# Patient Record
Sex: FEMALE | Race: BLACK OR AFRICAN AMERICAN | NOT HISPANIC OR LATINO | Employment: FULL TIME | URBAN - METROPOLITAN AREA
[De-identification: names, ages, dates, MRNs, and addresses within clinical notes are randomized per-mention and may not be internally consistent; named-entity substitution may affect disease eponyms.]

---

## 2017-03-08 ENCOUNTER — TELEPHONE (OUTPATIENT)
Dept: TRANSPLANT | Facility: CLINIC | Age: 47
End: 2017-03-08

## 2017-03-08 NOTE — TELEPHONE ENCOUNTER
Called pt to verify if she did lab work on 3/6/17. She states she will have them drawn on fri 3/10. Copy of standing lab order re-faxed to her lab.

## 2017-03-10 ENCOUNTER — TELEPHONE (OUTPATIENT)
Dept: TRANSPLANT | Facility: CLINIC | Age: 47
End: 2017-03-10

## 2017-03-10 LAB
EXT ALBUMIN: 3.5
EXT ALKALINE PHOSPHATASE: 53
EXT ALT: 31
EXT AST: 24
EXT BASOPHIL%: 0
EXT BILIRUBIN TOTAL: 0.6
EXT BUN: 16
EXT CALCIUM: 8.4
EXT CHLORIDE: 101
EXT CO2: 23
EXT CREATININE: 0.94 MG/DL
EXT EOSINOPHIL%: 4
EXT GGT: 50
EXT GLUCOSE: 144
EXT HEMATOCRIT: 37.9
EXT HEMOGLOBIN: 12.5
EXT LYMPH%: 34
EXT MONOCYTES%: 13
EXT PLATELETS: 388
EXT POTASSIUM: 3.9
EXT PROTEIN TOTAL: 8
EXT SEGS%: 48
EXT SODIUM: 135 MMOL/L
EXT TACROLIMUS LVL: 6
EXT WBC: 6.5

## 2017-03-13 ENCOUNTER — TELEPHONE (OUTPATIENT)
Dept: TRANSPLANT | Facility: CLINIC | Age: 47
End: 2017-03-13

## 2017-03-13 NOTE — LETTER
March 13, 2017    Kaye Chucky Modi  [address]  [place] [address]          Dear Kaye Modi:  MRN: [id number]    Your lab results were stable.  There are no medicine changes.  Please have your labs drawn again on 6/5/17.      If you cannot have your labs drawn on the scheduled date, it is your responsibility to call the transplant department to reschedule.  To reschedule or make an appointment, please as to speak to or leave a message for my assistant, La Cummings, at [phone].  When leaving a message for Zoila Tovar, or myself, we ask that you leave a brief message regarding your request.    Sincerely,    Jeannie Bailey, RN, BSN  Liver Transplant Coordinator  [facility]  [address] [place] [address]  [phone]

## 2017-04-03 ENCOUNTER — TELEPHONE (OUTPATIENT)
Dept: TRANSPLANT | Facility: CLINIC | Age: 47
End: 2017-04-03

## 2017-04-03 NOTE — TELEPHONE ENCOUNTER
Returned call. Pt states she is getting some teeth filled this week and wanted to make sure she did not need any antibiotics or special clearance. Advised pt that no special instructions needed. Pt verbalized understanding.

## 2017-04-03 NOTE — TELEPHONE ENCOUNTER
----- Message from Marry Victoria sent at 4/3/2017  4:02 PM CDT -----  Contact: pt  Asking for a call regarding dental work, please call [phone]

## 2017-06-06 ENCOUNTER — TELEPHONE (OUTPATIENT)
Dept: TRANSPLANT | Facility: CLINIC | Age: 47
End: 2017-06-06

## 2017-06-06 LAB
EXT ALBUMIN: 3.1
EXT ALKALINE PHOSPHATASE: 47
EXT ALT: 40
EXT AST: 36
EXT BASOPHIL%: 1
EXT BILIRUBIN TOTAL: 0.5
EXT BUN: 11
EXT CALCIUM: 8.4
EXT CHLORIDE: 106
EXT CO2: 21
EXT CREATININE: 0.96 MG/DL
EXT EOSINOPHIL%: 5
EXT GLUCOSE: 144
EXT HEMATOCRIT: 36.4
EXT HEMOGLOBIN: 11.6
EXT LYMPH%: 42
EXT MONOCYTES%: 8
EXT PLATELETS: 393
EXT POTASSIUM: 3.9
EXT PROTEIN TOTAL: 7.6
EXT SEGS%: 46
EXT SODIUM: 136 MMOL/L
EXT TACROLIMUS LVL: 5
EXT WBC: 6.9

## 2017-06-09 ENCOUNTER — TELEPHONE (OUTPATIENT)
Dept: TRANSPLANT | Facility: CLINIC | Age: 47
End: 2017-06-09

## 2017-06-09 NOTE — LETTER
June 9, 2017    Kaye Chucky Modi  [address]  [place] [address]          Dear Kaye Modi:  MRN: [id number]    Your lab results were stable.  There are no medicine changes.  Please have your labs drawn again on 9/4/17.      If you cannot have your labs drawn on the scheduled date, it is your responsibility to call the transplant department to reschedule.  To reschedule or make an appointment, please as to speak to or leave a message for my assistant, La Cummings, at [phone].  When leaving a message for Zoila Tovar, or myself, we ask that you leave a brief message regarding your request.    Sincerely,    Jeannie Bailey, RN, BSN  Liver Transplant Coordinator  [facility]  [address] [place] [address]  [phone]

## 2017-08-21 ENCOUNTER — OFFICE VISIT (OUTPATIENT)
Dept: TRANSPLANT | Facility: CLINIC | Age: 47
End: 2017-08-21
Payer: COMMERCIAL

## 2017-08-21 VITALS
HEIGHT: 66 IN | RESPIRATION RATE: 18 BRPM | WEIGHT: 232.56 LBS | OXYGEN SATURATION: 100 % | HEART RATE: 98 BPM | SYSTOLIC BLOOD PRESSURE: 167 MMHG | TEMPERATURE: 99 F | BODY MASS INDEX: 37.38 KG/M2 | DIASTOLIC BLOOD PRESSURE: 93 MMHG

## 2017-08-21 DIAGNOSIS — K75.4 AUTOIMMUNE HEPATITIS: ICD-10-CM

## 2017-08-21 DIAGNOSIS — Z29.89 NEED FOR PROPHYLACTIC IMMUNOTHERAPY: ICD-10-CM

## 2017-08-21 DIAGNOSIS — Z94.4 LIVER TRANSPLANTED: Primary | ICD-10-CM

## 2017-08-21 DIAGNOSIS — Z94.4 LIVER REPLACED BY TRANSPLANT: Primary | ICD-10-CM

## 2017-08-21 DIAGNOSIS — I10 ESSENTIAL HYPERTENSION: ICD-10-CM

## 2017-08-21 PROCEDURE — 99214 OFFICE O/P EST MOD 30 MIN: CPT | Mod: S$GLB,,, | Performed by: INTERNAL MEDICINE

## 2017-08-21 PROCEDURE — 3080F DIAST BP >= 90 MM HG: CPT | Mod: S$GLB,,, | Performed by: INTERNAL MEDICINE

## 2017-08-21 PROCEDURE — 99999 PR PBB SHADOW E&M-EST. PATIENT-LVL III: CPT | Mod: PBBFAC,,, | Performed by: INTERNAL MEDICINE

## 2017-08-21 PROCEDURE — 3077F SYST BP >= 140 MM HG: CPT | Mod: S$GLB,,, | Performed by: INTERNAL MEDICINE

## 2017-08-21 PROCEDURE — 3008F BODY MASS INDEX DOCD: CPT | Mod: S$GLB,,, | Performed by: INTERNAL MEDICINE

## 2017-08-21 NOTE — Clinical Note
August 21, 2017                     [facility]  [address]  [facility] [address]  Phone: [phone]   Patient: Kaye Modi   MR Number: [id number]   YOB: 1970   Date of Visit: 8/21/2017       Dear      Thank you for referring Kaye Modi to me for evaluation. Attached you will find relevant portions of my assessment and plan of care.    If you have questions, please do not hesitate to call me. I look forward to following Kaye Modi along with you.    Sincerely,    Giovanni William MD    Enclosure    If you would like to receive this communication electronically, please contact [email or web address] or [phone] to request [facility] Link access.    [facility] Link is a tool which provides read-only access to select patient information with whom you have a relationship. Its easy to use and provides real time access to review your patients record including encounter summaries, notes, results, and demographic information.    If you feel you have received this communication in error or would no longer like to receive these types of communications, please e-mail [email or web address]

## 2017-08-21 NOTE — PROGRESS NOTES
Subjective:       Patient ID: Kaye Modi is a 46 y.o. female.    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    Since then she has done very well and her only minor problem was the occurrence of an incisional hernia that was repaired in the early postoperative period.    She has never had any episodes of acute rejection although her enzymes have fluctuated mildly. She is currently on tacrolimus 3/2 mg twice a day and  mg twice a day.    Her glucose level is followed by her primary care doctor and she has controlled diabetes. She does have mild hypertension which is treated and followed by her primary care physician.  She remains well and her only problem is being overweight. She has recently managed to lose some weight.    LFTs- normal  Creatinine 0.96  Tac 5   Body mass index is 38.12 kg/m².    Review of Systems   Constitutional: Negative for activity change, appetite change, chills, fatigue, fever and unexpected weight change.   HENT: Negative for ear pain, hearing loss, nosebleeds, sore throat and trouble swallowing.    Eyes: Negative for redness and visual disturbance.   Respiratory: Negative for cough, chest tightness, shortness of breath and wheezing.    Cardiovascular: Negative for chest pain and palpitations.   Gastrointestinal: Negative for abdominal distention, abdominal pain, blood in stool, constipation, diarrhea, nausea and vomiting.   Genitourinary: Negative for difficulty urinating, dysuria, frequency, hematuria and urgency.   Musculoskeletal: Negative for arthralgias, back pain, gait problem, joint swelling and myalgias.   Skin: Negative for rash.   Neurological: Negative for tremors,  seizures, speech difficulty, weakness and headaches.   Hematological: Negative for adenopathy.   Psychiatric/Behavioral: Negative for confusion, decreased concentration and sleep disturbance. The patient is not nervous/anxious.          Objective:      Physical Exam   Constitutional: She appears well-nourished. No distress.   HENT:   Head: Normocephalic.   Eyes: Pupils are equal, round, and reactive to light.   Neck: No JVD present. No tracheal deviation present. No thyromegaly present.   Cardiovascular: Normal rate, regular rhythm and normal heart sounds.    No murmur heard.  Pulmonary/Chest: Effort normal and breath sounds normal. No stridor.   Abdominal: Soft.   Lymphadenopathy:        Head (right side): No submental, no submandibular, no tonsillar, no preauricular, no posterior auricular and no occipital adenopathy present.        Head (left side): No submental, no submandibular, no tonsillar, no preauricular, no posterior auricular and no occipital adenopathy present.     She has no cervical adenopathy.     She has no axillary adenopathy.   Neurological: She is alert. She has normal strength. She is not disoriented. No cranial nerve deficit or sensory deficit.   Skin: Skin is intact. No cyanosis.       Assessment:       1. Liver replaced by transplant    2. Need for prophylactic immunotherapy    3. Essential hypertension    4. Autoimmune hepatitis        Plan:   - no change in immunosuppression  - encouraged to lose more weight  - discussed dangers of NAFLD  - needs abdo US to check for fatty liver    - ?video visit in [place] in 1 year.

## 2017-08-30 DIAGNOSIS — Z94.4 LIVER TRANSPLANTED: ICD-10-CM

## 2017-08-30 NOTE — TELEPHONE ENCOUNTER
----- Message from Bella Francois sent at 8/30/2017  4:13 PM CDT -----  Contact: patient   Patient calling to get you to send over another prescription for her prograf and cell cept  to [facility] in [place]. Please call  or pt call

## 2017-08-31 ENCOUNTER — HOSPITAL ENCOUNTER (OUTPATIENT)
Dept: RADIOLOGY | Facility: CLINIC | Age: 47
Discharge: HOME | End: 2017-08-31
Attending: INTERNAL MEDICINE
Payer: COMMERCIAL

## 2017-08-31 DIAGNOSIS — Z94.4 LIVER REPLACED BY TRANSPLANT: ICD-10-CM

## 2017-08-31 DIAGNOSIS — I10 ESSENTIAL HYPERTENSION: ICD-10-CM

## 2017-08-31 DIAGNOSIS — Z94.4 LIVER TRANSPLANTED: ICD-10-CM

## 2017-08-31 DIAGNOSIS — K75.4 AUTOIMMUNE HEPATITIS: ICD-10-CM

## 2017-08-31 DIAGNOSIS — Z29.89 NEED FOR PROPHYLACTIC IMMUNOTHERAPY: ICD-10-CM

## 2017-08-31 PROCEDURE — 93975 VASCULAR STUDY: CPT | Mod: TC,PO

## 2017-08-31 PROCEDURE — 93975 VASCULAR STUDY: CPT | Mod: 26,,, | Performed by: RADIOLOGY

## 2017-08-31 PROCEDURE — 76705 ECHO EXAM OF ABDOMEN: CPT | Mod: 26,59,, | Performed by: RADIOLOGY

## 2017-08-31 RX ORDER — TACROLIMUS 1 MG/1
CAPSULE ORAL
Qty: 150 CAPSULE | Refills: 11 | Status: SHIPPED | OUTPATIENT
Start: 2017-08-31 | End: 2019-09-13 | Stop reason: SDUPTHER

## 2017-08-31 RX ORDER — TACROLIMUS 1 MG/1
CAPSULE ORAL
Qty: 150 CAPSULE | Refills: 11 | Status: SHIPPED | OUTPATIENT
Start: 2017-08-31 | End: 2018-09-04 | Stop reason: SDUPTHER

## 2017-08-31 RX ORDER — MYCOPHENOLATE MOFETIL 250 MG/1
CAPSULE ORAL
Qty: 120 CAPSULE | Refills: 11 | Status: SHIPPED | OUTPATIENT
Start: 2017-08-31 | End: 2019-09-13 | Stop reason: SDUPTHER

## 2017-08-31 RX ORDER — MYCOPHENOLATE MOFETIL 250 MG/1
500 CAPSULE ORAL 2 TIMES DAILY
Qty: 120 CAPSULE | Refills: 11 | Status: SHIPPED | OUTPATIENT
Start: 2017-08-31 | End: 2018-09-04 | Stop reason: SDUPTHER

## 2017-08-31 NOTE — LETTER
September 1, 2017    Kaye Modi  [address]  [place] [address]             [facility]  [address]  [facility] [address]  Phone: [phone] Ms. Modi,    Dr. William received and reviewed the results of your ultrasound. He said everything is stable.    Please let me know if you need anything or have any questions.    Sincerely,    Jeannie Bailey, RN, BSN  Liver Transplant Coordinator

## 2017-09-08 ENCOUNTER — TELEPHONE (OUTPATIENT)
Dept: TRANSPLANT | Facility: CLINIC | Age: 47
End: 2017-09-08

## 2017-09-08 NOTE — LETTER
September 8, 2017    Kaye Modi  [address]  [place] [address]          Dear Kaye Rian:  MRN: [id number]    Your lab work was due to be drawn on 9/5/17.  We contacted your lab and were unable to get test results.  It is very important to get your labs drawn as scheduled.  We cannot monitor you for rejection, infections, or drug toxicity side effects without lab results.  Please call us at [phone] as soon as possible to let us know when you plan to have labs drawn.    Sincerely,    Jeannie Bailey, RN, BSN  Liver Transplant Coordinator  [facility]  [address] [place] [address]  [phone]

## 2017-09-11 ENCOUNTER — TELEPHONE (OUTPATIENT)
Dept: TRANSPLANT | Facility: CLINIC | Age: 47
End: 2017-09-11

## 2017-09-11 LAB
EXT ALBUMIN: 3.4
EXT ALKALINE PHOSPHATASE: 55
EXT ALT: 44
EXT AST: 35
EXT BASOPHIL%: 1
EXT BILIRUBIN TOTAL: 0.7
EXT BUN: 8
EXT CALCIUM: 8.7
EXT CHLORIDE: 102
EXT CO2: 26
EXT CREATININE: 0.8 MG/DL
EXT EOSINOPHIL%: 5
EXT GGT: 46
EXT GLUCOSE: 125
EXT HEMATOCRIT: 42.1
EXT HEMOGLOBIN: 13.2
EXT LYMPH%: 42
EXT MONOCYTES%: 12
EXT PLATELETS: 326
EXT POTASSIUM: 4.2
EXT PROTEIN TOTAL: 8
EXT SEGS%: 41
EXT SODIUM: 134 MMOL/L
EXT TACROLIMUS LVL: 5.6
EXT WBC: 5.3

## 2017-09-14 ENCOUNTER — TELEPHONE (OUTPATIENT)
Dept: TRANSPLANT | Facility: CLINIC | Age: 47
End: 2017-09-14

## 2017-09-14 NOTE — LETTER
September 14, 2017    Kaye Modi  [address]  [place] [address]          Dear Kaye Rian:  MRN: [id number]    Your lab results were stable.  There are no medicine changes.  Please have your labs drawn again on 12/11/17.      If you cannot have your labs drawn on the scheduled date, it is your responsibility to call the transplant department to reschedule.  To reschedule or make an appointment, please as to speak to or leave a message for my assistant, La Cummings, at [phone].  When leaving a message for Zoila Tovar, or myself, we ask that you leave a brief message regarding your request.    Sincerely,    Jeannie Bailey, RN, BSN  Liver Transplant Coordinator  [facility]  [address] [place] [address]  [phone]

## 2017-12-13 ENCOUNTER — TELEPHONE (OUTPATIENT)
Dept: TRANSPLANT | Facility: CLINIC | Age: 47
End: 2017-12-13

## 2017-12-13 LAB
EXT ALBUMIN: 3.6
EXT ALKALINE PHOSPHATASE: 63
EXT ALT: 45
EXT AST: 29
EXT BASOPHIL%: 0
EXT BILIRUBIN TOTAL: 0.5
EXT BUN: 10
EXT CALCIUM: 8.4
EXT CHLORIDE: 102
EXT CO2: 24
EXT CREATININE: 0.68 MG/DL
EXT EOSINOPHIL%: 4
EXT GGT: 55
EXT GLUCOSE: 104
EXT HEMATOCRIT: 40.7
EXT HEMOGLOBIN: 12.9
EXT LYMPH%: 43
EXT MONOCYTES%: 8
EXT PLATELETS: 373
EXT POTASSIUM: 4
EXT PROTEIN TOTAL: 8.1
EXT SEGS%: 45
EXT SODIUM: 135 MMOL/L
EXT TACROLIMUS LVL: 4.1
EXT WBC: 5.4

## 2017-12-18 ENCOUNTER — TELEPHONE (OUTPATIENT)
Dept: TRANSPLANT | Facility: CLINIC | Age: 47
End: 2017-12-18

## 2017-12-18 NOTE — TELEPHONE ENCOUNTER
Letter sent, labs stable and no medication changes are needed. Repeat labs due 3/12/18 per protocol.

## 2017-12-18 NOTE — LETTER
December 18, 2017    Kaye Modi  [address]  [place] [address]          Dear Kaye Rian:  MRN: [id number]    Your lab results were stable.  There are no medicine changes.  Please have your labs drawn again on 3/12/18.      If you cannot have your labs drawn on the scheduled date, it is your responsibility to call the transplant department to reschedule.  To reschedule or make an appointment, please as to speak to or leave a message for my assistant, La Cummings, at [phone].  When leaving a message for Zoila Tovar, or myself, we ask that you leave a brief message regarding your request.    Sincerely,    Jeannie Bailey, RN, BSN  Liver Transplant Coordinator  [facility]  [address] [place] [address]  [phone]

## 2018-03-13 LAB
EXT ALBUMIN: 3.4
EXT ALKALINE PHOSPHATASE: 57
EXT ALT: 40
EXT AST: 27
EXT BASOPHIL%: 0
EXT BILIRUBIN TOTAL: 0.8
EXT BUN: 9
EXT CALCIUM: 8.9
EXT CHLORIDE: 103
EXT CO2: 24
EXT CREATININE: 0.83 MG/DL
EXT EOSINOPHIL%: 5
EXT GGT: 52
EXT GLUCOSE: 151
EXT HEMATOCRIT: 40.6
EXT HEMOGLOBIN: 13
EXT LYMPH%: 39
EXT MONOCYTES%: 10
EXT PLATELETS: 330
EXT POTASSIUM: 3.7
EXT PROTEIN TOTAL: 7.9
EXT SEGS%: 46
EXT SODIUM: 135 MMOL/L
EXT TACROLIMUS LVL: 4.5
EXT WBC: 5.1

## 2018-03-16 ENCOUNTER — TELEPHONE (OUTPATIENT)
Dept: TRANSPLANT | Facility: CLINIC | Age: 48
End: 2018-03-16

## 2018-03-16 NOTE — LETTER
March 16, 2018    Kaye Modi  [address]  [place] [address]          Dear Kaye Rian:  MRN: [id number]    Your lab results were stable.  There are no medicine changes.  Please have your labs drawn again on 6/11/18.      If you cannot have your labs drawn on the scheduled date, it is your responsibility to call the transplant department to reschedule.  To reschedule or make an appointment, please as to speak to or leave a message for my assistant, La Cummings, at [phone].  When leaving a message for Zoila Tovar, or myself, we ask that you leave a brief message regarding your request.    Sincerely,      Jeannie Bailey, RN, BSN  Liver Transplant Coordinator  [facility]  [address] [place] [address]  [phone]

## 2018-05-02 ENCOUNTER — TELEPHONE (OUTPATIENT)
Dept: TRANSPLANT | Facility: CLINIC | Age: 48
End: 2018-05-02

## 2018-05-02 NOTE — TELEPHONE ENCOUNTER
----- Message from Harrison Meneses sent at 5/2/2018  3:46 PM CDT -----  Contact: patient   Patient have an medical question about her health and would like a call today if possible.         Please call [phone] or [phone]        Thanks

## 2018-05-02 NOTE — TELEPHONE ENCOUNTER
No answer on pt's cell number.  LVM requesting return call.    Called pt on 2nd number. Spoke with pt. She said she is trying to continue her weight loss. State she has been going to a gym and working out. Wants to know is she can drink smoothies. Advised that smoothies are fine as long as they are not herbal based.  Also discussed that we do not advise any weight loss pills/ supplements. She verbalized understanding. Did offer to help her get into our bariatric weight loss program. She will think about it and let me know if she is interested.

## 2018-06-11 LAB
EXT ALBUMIN: 3.5
EXT ALKALINE PHOSPHATASE: 72
EXT ALT: 31
EXT AST: 20
EXT BASOPHIL%: 1
EXT BILIRUBIN TOTAL: 0.5
EXT BUN: 8
EXT CALCIUM: 7.9
EXT CHLORIDE: 103
EXT CO2: 23
EXT CREATININE: 0.75 MG/DL
EXT EOSINOPHIL%: 6
EXT GGT: 44
EXT GLUCOSE: 174
EXT HEMATOCRIT: 39.9
EXT HEMOGLOBIN: 13.1
EXT LYMPH%: 40
EXT MONOCYTES%: 9
EXT PLATELETS: 319
EXT POTASSIUM: 3.9
EXT PROTEIN TOTAL: 7.7
EXT SEGS%: 45
EXT SODIUM: 133 MMOL/L
EXT TACROLIMUS LVL: 7.4
EXT WBC: 5.6

## 2018-06-12 ENCOUNTER — TELEPHONE (OUTPATIENT)
Dept: TRANSPLANT | Facility: CLINIC | Age: 48
End: 2018-06-12

## 2018-06-13 ENCOUNTER — TELEPHONE (OUTPATIENT)
Dept: TRANSPLANT | Facility: CLINIC | Age: 48
End: 2018-06-13

## 2018-06-13 NOTE — LETTER
June 13, 2018    Kaye Modi  [address]  [place] [address]          Dear Kaye Rian:  MRN: [id number]    Your lab results were stable.  There are no medicine changes.  Please have your labs drawn again on 9/10/18.      If you cannot have your labs drawn on the scheduled date, it is your responsibility to call the transplant department to reschedule.  To reschedule or make an appointment, please as to speak to or leave a message for my assistant, La Cummings, at [phone].  When leaving a message for Zoila Tovar, or myself, we ask that you leave a brief message regarding your request.    Sincerely,      Jeannie Bailey, RN, BSN  Liver Transplant Coordinator    [facility]  [address] [place] [address]  [phone]

## 2018-06-13 NOTE — TELEPHONE ENCOUNTER
Letter sent, labs stable and no medication changes are needed. Repeat labs due 9/10/18 per protocol.

## 2018-09-04 DIAGNOSIS — Z94.4 LIVER TRANSPLANTED: ICD-10-CM

## 2018-09-04 RX ORDER — TACROLIMUS 1 MG/1
CAPSULE ORAL
Qty: 150 CAPSULE | Refills: 11 | Status: SHIPPED | OUTPATIENT
Start: 2018-09-04 | End: 2019-09-13 | Stop reason: SDUPTHER

## 2018-09-04 RX ORDER — MYCOPHENOLATE MOFETIL 250 MG/1
CAPSULE ORAL
Qty: 120 CAPSULE | Refills: 11 | Status: SHIPPED | OUTPATIENT
Start: 2018-09-04 | End: 2019-09-13 | Stop reason: SDUPTHER

## 2018-09-11 LAB
EXT ALBUMIN: 3.6
EXT ALKALINE PHOSPHATASE: 73
EXT ALT: 55
EXT AST: 37
EXT BASOPHIL%: 1
EXT BILIRUBIN TOTAL: 0.7
EXT BUN: 8
EXT CALCIUM: 8.6
EXT CHLORIDE: 105
EXT CO2: 25
EXT CREATININE: 0.79 MG/DL
EXT EOSINOPHIL%: 7
EXT GGT: 101
EXT GLUCOSE: 146
EXT HEMATOCRIT: 41.5
EXT HEMOGLOBIN: 13.5
EXT LYMPH%: 48
EXT MONOCYTES%: 9
EXT PLATELETS: 333
EXT POTASSIUM: 3.8
EXT PROTEIN TOTAL: 8.4
EXT SEGS%: 36
EXT SODIUM: 140 MMOL/L
EXT TACROLIMUS LVL: 7.4
EXT WBC: 4.1

## 2018-09-12 ENCOUNTER — TELEPHONE (OUTPATIENT)
Dept: TRANSPLANT | Facility: CLINIC | Age: 48
End: 2018-09-12

## 2018-09-12 NOTE — TELEPHONE ENCOUNTER
----- Message from Karina Aguilar sent at 9/12/2018  8:26 AM CDT -----  Called and sp to pt about video visit. Re/[facility]'ed her for video visit  ----- Message -----  From: Jeannie Bailey RN  Sent: 9/11/2018   5:11 PM  To: Karina Dennis    Sending to both of you. Can one of you find out if this is still possible?    Jeannie    ----- Message -----  From: Damaris Claudio  Sent: 9/11/2018   4:51 PM  To: [facility] Post-Liver Transplant Clinical    Pt calling to find out if she can still do a video phone conference in place of appt on 9/24/18    pls call to confirm or deny    Contact cell [phone] or home [phone]

## 2018-09-14 ENCOUNTER — TELEPHONE (OUTPATIENT)
Dept: TRANSPLANT | Facility: CLINIC | Age: 48
End: 2018-09-14

## 2018-09-14 ENCOUNTER — PATIENT MESSAGE (OUTPATIENT)
Dept: TRANSPLANT | Facility: CLINIC | Age: 48
End: 2018-09-14

## 2018-09-14 NOTE — TELEPHONE ENCOUNTER
Letter sent, labs stable and no medication changes are needed. Repeat labs due 12/10/18 per protocol.

## 2018-09-14 NOTE — TELEPHONE ENCOUNTER
----- Message from Karina Aguilar sent at 9/14/2018  3:25 PM CDT -----  Called pt and told her that we need her to sing her consent form for her video visit. She said she will do it when she gets home

## 2018-09-14 NOTE — LETTER
September 14, 2018    Kaye Modi  [address]  [place] [address]          Dear Kaye Rian:  MRN: [id number]    Your lab results were stable.  There are no medicine changes.  Please have your labs drawn again on 12/10/18.      If you cannot have your labs drawn on the scheduled date, it is your responsibility to call the transplant department to reschedule.  To reschedule or make an appointment, please as to speak to or leave a message for my assistant, La Cummings, at [phone].  When leaving a message for Zoila Tovar, or myself, we ask that you leave a brief message regarding your request.    Sincerely,      Jeannie Bailey, RN, BSN  Liver Transplant Coordinator  [facility]  [address] [place] [address]  [phone]

## 2018-09-18 ENCOUNTER — TELEPHONE (OUTPATIENT)
Dept: TRANSPLANT | Facility: CLINIC | Age: 48
End: 2018-09-18

## 2018-09-18 NOTE — TELEPHONE ENCOUNTER
----- Message from Damaris Claudio sent at 9/18/2018  3:58 PM CDT -----  Calling to find out where to go on the portal to sign paper work     Contact [phone]

## 2018-09-18 NOTE — TELEPHONE ENCOUNTER
Called pt, reviewed how to complete form on [employer]. Pt will call back if she has any further problems.

## 2018-09-24 ENCOUNTER — PATIENT MESSAGE (OUTPATIENT)
Dept: TRANSPLANT | Facility: CLINIC | Age: 48
End: 2018-09-24

## 2018-09-24 ENCOUNTER — OFFICE VISIT (OUTPATIENT)
Dept: TRANSPLANT | Facility: CLINIC | Age: 48
End: 2018-09-24
Payer: COMMERCIAL

## 2018-09-24 DIAGNOSIS — Z29.89 NEED FOR PROPHYLACTIC IMMUNOTHERAPY: Primary | ICD-10-CM

## 2018-09-24 DIAGNOSIS — E11.9 CONTROLLED TYPE 2 DIABETES MELLITUS WITHOUT COMPLICATION, WITHOUT LONG-TERM CURRENT USE OF INSULIN: ICD-10-CM

## 2018-09-24 DIAGNOSIS — Z94.4 LIVER REPLACED BY TRANSPLANT: ICD-10-CM

## 2018-09-24 DIAGNOSIS — K75.4 AUTOIMMUNE HEPATITIS: ICD-10-CM

## 2018-09-24 PROCEDURE — 99444 PR PHYSICIAN ONLINE EVALUATION & MANAGEMENT SERVICE: CPT | Mod: CSM,,, | Performed by: INTERNAL MEDICINE

## 2018-09-24 NOTE — PROGRESS NOTES
Subjective:       Patient ID: Kaye Modi is a 47 y.o. female.    Chief Complaint: No chief complaint on file.    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    Since then she has done very well and her only minor problem was the occurrence of an incisional hernia that was repaired in the early postoperative period.    She has never had any episodes of acute rejection although her enzymes have fluctuated mildly. She is currently on tacrolimus 3/2 mg twice a day and  mg twice a day.    Her glucose level is followed by her primary care doctor and she has controlled diabetes. She does have mild hypertension which is treated and followed by her primary care physician.  She remains well and her only problem is being overweight. She has recently managed to lose some weight.    LFTs- recently raised to around 55  Creatinine 0.96  Tac 5   There is no height or weight on file to calculate BMI.    Review of Systems   Constitutional: Negative for activity change, appetite change, chills, fatigue, fever and unexpected weight change.   HENT: Negative for ear pain, hearing loss, nosebleeds, sore throat and trouble swallowing.    Eyes: Negative for redness and visual disturbance.   Respiratory: Negative for cough, chest tightness, shortness of breath and wheezing.    Cardiovascular: Negative for chest pain and palpitations.   Gastrointestinal: Negative for abdominal distention, abdominal pain, blood in stool, constipation, diarrhea, nausea and vomiting.   Genitourinary: Negative for difficulty urinating, dysuria, frequency, hematuria and urgency.   Musculoskeletal: Negative for arthralgias, back pain, gait problem, joint swelling and myalgias.   Skin:  Negative for rash.   Neurological: Negative for tremors, seizures, speech difficulty, weakness and headaches.   Hematological: Negative for adenopathy.   Psychiatric/Behavioral: Negative for confusion, decreased concentration and sleep disturbance. The patient is not nervous/anxious.          Objective:    No examination done- this was a video visit.        Assessment:       1. Need for prophylactic immunotherapy    2. Liver replaced by transplant    3. Controlled type 2 diabetes mellitus without complication, without long-term current use of insulin    4. Autoimmune hepatitis        Plan:   - no change in immunosuppression  - encouraged to lose more weight  - discussed risks of NAFLD    - repeat LFTs in 2 weeks and may arrange a liver biopsy to rule out ACR, NAFLD or recurrence of autoimmune hep.    Clinic in 1 year- video visit    - ?video visit in [place] in 1 year.

## 2018-09-24 NOTE — Clinical Note
September 24, 2018                     [facility]  [address]  [facility] [address]  Phone: [phone]   Patient: Kaye Modi   MR Number: [id number]   YOB: 1970   Date of Visit: 9/24/2018       Dear      Thank you for referring Kaye Modi to me for evaluation. Attached you will find relevant portions of my assessment and plan of care.    If you have questions, please do not hesitate to call me. I look forward to following Kaye Modi along with you.    Sincerely,    Giovanni William MD    Enclosure    If you would like to receive this communication electronically, please contact [email or web address] or [phone] to request [email or web address] Link access.    [email or web address] Link is a tool which provides read-only access to select patient information with whom you have a relationship. Its easy to use and provides real time access to review your patients record including encounter summaries, notes, results, and demographic information.    If you feel you have received this communication in error or would no longer like to receive these types of communications, please e-mail [email or web address]

## 2018-10-11 ENCOUNTER — TELEPHONE (OUTPATIENT)
Dept: TRANSPLANT | Facility: CLINIC | Age: 48
End: 2018-10-11

## 2018-10-11 NOTE — TELEPHONE ENCOUNTER
Called pt to discuss need for repeat labs. No answer. LVM requesting repeat labs Monday, 10/15/18. Requested return call with any questions or concerns.

## 2018-10-15 LAB
EXT ALBUMIN: 3.7
EXT ALKALINE PHOSPHATASE: 70
EXT ALT: 34
EXT AST: 21
EXT BASOPHIL%: 1
EXT BILIRUBIN TOTAL: 0.6
EXT BUN: 10
EXT CALCIUM: 8.5
EXT CHLORIDE: 103
EXT CO2: 26
EXT CREATININE: 0.88 MG/DL
EXT EOSINOPHIL%: 6
EXT GGT: 61
EXT GLUCOSE: 162
EXT HEMATOCRIT: 38.9
EXT HEMOGLOBIN: 12.6
EXT INR: 0.95
EXT LYMPH%: 42
EXT MONOCYTES%: 10
EXT PLATELETS: 362
EXT POTASSIUM: 4
EXT PROTEIN TOTAL: 8.6
EXT PT: 9.7
EXT SEGS%: 41
EXT SODIUM: 136 MMOL/L
EXT TACROLIMUS LVL: 4.3
EXT WBC: 5.7

## 2018-10-17 ENCOUNTER — TELEPHONE (OUTPATIENT)
Dept: TRANSPLANT | Facility: CLINIC | Age: 48
End: 2018-10-17

## 2018-10-17 NOTE — TELEPHONE ENCOUNTER
Spoke with pt. Reviewed stable lab results and that she does not need a biopsy per Dr. William. Pt agreed to repeat labs in 3 months - 1/14/19.  Will send letter as a reminder.

## 2018-10-17 NOTE — LETTER
October 17, 2018    Kaye Modi  [address]  [place] [address]          Dear Kaye Rian:  MRN: [id number]    Your lab results were stable.  There are no medicine changes.  Please have your labs drawn again on 1/14/19.      If you cannot have your labs drawn on the scheduled date, it is your responsibility to call the transplant department to reschedule.  To reschedule or make an appointment, please as to speak to or leave a message for my assistant, La Cummings, at [phone].  When leaving a message for Zoila Tovar, or myself, we ask that you leave a brief message regarding your request.    Sincerely,      Jeannie Bailey, RN, BSN  Liver Transplant Coordinator  [facility]  [address] [place] [address]  [phone]

## 2018-10-17 NOTE — TELEPHONE ENCOUNTER
----- Message from Giovanni William MD sent at 10/17/2018  9:57 AM CDT -----  Hold off for now    ----- Message -----  From: Jeannie Bailey RN  Sent: 10/17/2018   9:20 AM  To: Giovanni William MD    Your clinic note mentioned she may need a liver biopsy to check for JOHNSON but you wanted to see her labs first.  Do you want me to schedule her a biopsy?    Jeannie    ----- Message -----  From: Giovanni William MD  Sent: 10/15/2018   4:02 PM  To: [facility] Post-Liver Transplant Clinical    Results reviewed

## 2019-01-15 LAB
EXT ALBUMIN: 3.3
EXT ALKALINE PHOSPHATASE: 87
EXT ALT: 63
EXT AST: 33
EXT BASOPHIL%: 1
EXT BILIRUBIN TOTAL: 0.4
EXT BUN: 13
EXT CALCIUM: 8.6
EXT CHLORIDE: 103
EXT CO2: 27
EXT CREATININE: 0.9 MG/DL
EXT EOSINOPHIL%: 4
EXT GGT: 97
EXT GLUCOSE: 184
EXT HEMATOCRIT: 36.6
EXT HEMOGLOBIN: 11.9
EXT LYMPH%: 39
EXT MONOCYTES%: 6
EXT PLATELETS: 382
EXT POTASSIUM: 4.3
EXT PROTEIN TOTAL: 8
EXT SEGS%: 50
EXT SODIUM: 136 MMOL/L
EXT TACROLIMUS LVL: 6.9
EXT WBC: 6.7

## 2019-01-22 ENCOUNTER — TELEPHONE (OUTPATIENT)
Dept: TRANSPLANT | Facility: CLINIC | Age: 49
End: 2019-01-22

## 2019-01-22 NOTE — TELEPHONE ENCOUNTER
Spoke with pt. Reviewed lab results and that LFTs slightly elevated. Requested repeat labs in 1 month - 2/11/19. Pt agreed with plan.

## 2019-02-06 NOTE — TELEPHONE ENCOUNTER
Ernesto English [phone] [address] [facility] to leave a detailed message Tacrolimus level pending

## 2019-02-12 LAB
EXT ALBUMIN: 3.4
EXT ALKALINE PHOSPHATASE: 76
EXT ALT: 37
EXT AST: 23
EXT BASOPHIL%: 1
EXT BILIRUBIN TOTAL: 0.7
EXT BUN: 10
EXT CALCIUM: 8.5
EXT CHLORIDE: 102
EXT CO2: 25
EXT CREATININE: 0.79 MG/DL
EXT EOSINOPHIL%: 6
EXT GGT: 66
EXT GLUCOSE: 190
EXT HEMATOCRIT: 36.8
EXT HEMOGLOBIN: 12.2
EXT LYMPH%: 47
EXT MONOCYTES%: 9
EXT PLATELETS: 370
EXT POTASSIUM: 3.9
EXT PROTEIN TOTAL: 8.1
EXT SEGS%: 37
EXT SODIUM: 137 MMOL/L
EXT TACROLIMUS LVL: 7.7
EXT WBC: 4.8

## 2019-02-13 ENCOUNTER — TELEPHONE (OUTPATIENT)
Dept: TRANSPLANT | Facility: CLINIC | Age: 49
End: 2019-02-13

## 2019-02-18 ENCOUNTER — TELEPHONE (OUTPATIENT)
Dept: TRANSPLANT | Facility: CLINIC | Age: 49
End: 2019-02-18

## 2019-02-18 NOTE — TELEPHONE ENCOUNTER
Letter sent, labs stable and no medication changes are needed. Repeat labs due 5/13/19 per protocol.

## 2019-02-18 NOTE — LETTER
February 18, 2019    Kaye Modi  [address]   [place] [address]          Dear Kaye Rian:  MRN: [id number]    This is a follow up to your recent labs, your lab results were stable.  There are no medicine changes.  Please have your labs drawn again on 5/13/19.      If you cannot have your labs drawn on the scheduled date, it is your responsibility to call the transplant department to reschedule.  To reschedule or make an appointment, please as to speak to or leave a message for my assistant, La Cummings, at [phone].  When leaving a message for Zoila Tovar, or myself, we ask that you leave a brief message regarding your request.    Sincerely,    Jeannie Bailey, RN, BSN, [facility]  Liver Transplant Coordinator  [facility]  [address] [place] [address]  [phone]

## 2019-05-14 LAB
EXT ALBUMIN: 3.5
EXT ALKALINE PHOSPHATASE: 76
EXT ALT: 29
EXT AST: 18
EXT BASOPHIL%: 1
EXT BILIRUBIN TOTAL: 0.7
EXT BUN: 13
EXT CALCIUM: 9.2
EXT CHLORIDE: 99
EXT CO2: 23
EXT CREATININE: 0.87 MG/DL
EXT EOSINOPHIL%: 6
EXT GGT: 40
EXT GLUCOSE: 317
EXT HEMATOCRIT: 40.9
EXT HEMOGLOBIN: 13.1
EXT LYMPH%: 39
EXT MONOCYTES%: 8
EXT PLATELETS: 301
EXT POTASSIUM: 4
EXT PROTEIN TOTAL: 8
EXT SEGS%: 47
EXT SODIUM: 135 MMOL/L
EXT TACROLIMUS LVL: 5.3
EXT WBC: 4.8

## 2019-05-15 ENCOUNTER — TELEPHONE (OUTPATIENT)
Dept: TRANSPLANT | Facility: CLINIC | Age: 49
End: 2019-05-15

## 2019-05-17 ENCOUNTER — TELEPHONE (OUTPATIENT)
Dept: TRANSPLANT | Facility: CLINIC | Age: 49
End: 2019-05-17

## 2019-05-17 NOTE — LETTER
May 17, 2019    Kaye Modi  [address]   [place] [address]          Dear Kaye Rian:  MRN: [id number]    This is a follow up to your recent labs, your lab results were stable.  There are no medicine changes.  Please have your labs drawn again on 8/19/19.      If you cannot have your labs drawn on the scheduled date, it is your responsibility to call the transplant department to reschedule.  To reschedule or make an appointment, please as to speak to or leave a message for my assistant, Sue Tovar or Ellie, at [phone].  When leaving a message for Sue Tovar Angela or myself, we ask that you leave a brief message regarding your request.    Sincerely,    Jeannie Bailey, RN, BSN, [facility]  Liver Transplant Coordinator  [facility]  [address] [place] [address]  [phone]

## 2019-05-17 NOTE — TELEPHONE ENCOUNTER
Letter sent, labs stable and no medication changes are needed. Repeat labs due 8/19/19 per protocol.

## 2019-08-19 LAB
EXT ALBUMIN: 3.3
EXT ALKALINE PHOSPHATASE: 83
EXT ALT: 19
EXT AST: 15
EXT BASOPHIL%: 1
EXT BILIRUBIN TOTAL: 0.5
EXT BUN: 10
EXT CALCIUM: 8.6
EXT CHLORIDE: 102
EXT CO2: 22
EXT CREATININE: 0.78 MG/DL
EXT EOSINOPHIL%: 5
EXT GGT: 35
EXT GLUCOSE: 263
EXT HEMATOCRIT: 38
EXT HEMOGLOBIN: 12
EXT LYMPH%: 32
EXT MONOCYTES%: 14
EXT PLATELETS: 381
EXT POTASSIUM: 3.9
EXT PROTEIN TOTAL: 7.9
EXT SEGS%: 48
EXT SODIUM: 134 MMOL/L
EXT TACROLIMUS LVL: 5.3
EXT WBC: 6.6

## 2019-08-20 ENCOUNTER — TELEPHONE (OUTPATIENT)
Dept: TRANSPLANT | Facility: CLINIC | Age: 49
End: 2019-08-20

## 2019-08-22 ENCOUNTER — TELEPHONE (OUTPATIENT)
Dept: TRANSPLANT | Facility: CLINIC | Age: 49
End: 2019-08-22

## 2019-08-22 NOTE — TELEPHONE ENCOUNTER
Letter sent, labs stable and no medication changes are needed. Repeat labs due 12/2/19 per protocol.

## 2019-08-22 NOTE — LETTER
August 22, 2019    Kaye Modi  [address]   [place] [address]          Dear Kaye Rian:  MRN: [id number]    This is a follow up to your recent labs, your lab results were stable.  There are no medicine changes.  Please have your labs drawn again on 12/2/19.      If you cannot have your labs drawn on the scheduled date, it is your responsibility to call the transplant department to reschedule.  To reschedule or make an appointment, please as to speak to or leave a message for my assistant, Sue Tovar or Ellie, at [phone].  When leaving a message for Sue Tovar Angela or myself, we ask that you leave a brief message regarding your request.    Sincerely,      Jeannie Bailey, RN, BSN, [facility]  Liver Transplant Coordinator  [facility]  [address] [place] [address]  [phone]

## 2019-08-23 ENCOUNTER — TELEPHONE (OUTPATIENT)
Dept: TRANSPLANT | Facility: CLINIC | Age: 49
End: 2019-08-23

## 2019-08-23 NOTE — TELEPHONE ENCOUNTER
----- Message from Karina Aguilar sent at 8/23/2019  2:31 PM CDT -----    Called and sp to pt about [facility] a vidoe visit appt. Pt is [facility]'ed for 11/4 and remind her that she will need to be in [place] at the time of the appt with a copay of $49.

## 2019-09-13 RX ORDER — MYCOPHENOLATE MOFETIL 250 MG/1
CAPSULE ORAL
Qty: 120 CAPSULE | Refills: 11 | Status: SHIPPED | OUTPATIENT
Start: 2019-09-13 | End: 2019-09-13 | Stop reason: SDUPTHER

## 2019-09-13 RX ORDER — TACROLIMUS 1 MG/1
CAPSULE ORAL
Qty: 150 CAPSULE | Refills: 11 | Status: SHIPPED | OUTPATIENT
Start: 2019-09-13 | End: 2019-09-13 | Stop reason: SDUPTHER

## 2019-09-13 NOTE — TELEPHONE ENCOUNTER
Sent refills for Cellcept 500 mg by mouth BID and Prograf 3mg by mouth every am and 2 mg by mouth every pm per patient mar and past notes

## 2019-09-13 NOTE — TELEPHONE ENCOUNTER
----- Message from Bora Reyna sent at 9/13/2019  1:44 PM CDT -----  Contact: patient   Please call pt at [phone]    Refill request for mycophenolate (CELLCEPT) 250 mg Cap (4 tabs a day) &   tacrolimus (PROGRAF) 1 MG Cap (5 tabs a day)    [facility] [place] [place] - [address] [facility]    Thank you

## 2019-09-16 RX ORDER — MYCOPHENOLATE MOFETIL 250 MG/1
500 CAPSULE ORAL 2 TIMES DAILY
Qty: 120 CAPSULE | Refills: 11 | Status: SHIPPED | OUTPATIENT
Start: 2019-09-16 | End: 2020-09-24

## 2019-09-16 RX ORDER — TACROLIMUS 1 MG/1
CAPSULE ORAL
Qty: 150 CAPSULE | Refills: 11 | Status: SHIPPED | OUTPATIENT
Start: 2019-09-16 | End: 2020-09-24

## 2019-11-04 ENCOUNTER — OFFICE VISIT (OUTPATIENT)
Dept: TRANSPLANT | Facility: CLINIC | Age: 49
End: 2019-11-04
Payer: COMMERCIAL

## 2019-11-04 DIAGNOSIS — Z94.4 LIVER REPLACED BY TRANSPLANT: ICD-10-CM

## 2019-11-04 DIAGNOSIS — E11.9 CONTROLLED TYPE 2 DIABETES MELLITUS WITHOUT COMPLICATION, WITHOUT LONG-TERM CURRENT USE OF INSULIN: ICD-10-CM

## 2019-11-04 DIAGNOSIS — Z29.89 NEED FOR PROPHYLACTIC IMMUNOTHERAPY: Primary | ICD-10-CM

## 2019-11-04 DIAGNOSIS — K75.4 AUTOIMMUNE HEPATITIS: ICD-10-CM

## 2019-11-04 PROCEDURE — 99214 OFFICE O/P EST MOD 30 MIN: CPT | Mod: 95,,, | Performed by: INTERNAL MEDICINE

## 2019-11-04 PROCEDURE — 99214 PR OFFICE/OUTPT VISIT, EST, LEVL IV, 30-39 MIN: ICD-10-PCS | Mod: 95,,, | Performed by: INTERNAL MEDICINE

## 2019-11-04 NOTE — Clinical Note
November 4, 2019                     [facility]  [address]  [facility] [address]  Phone: [phone]   Patient: Kaye Modi   MR Number: [id number]   YOB: 1970   Date of Visit: 11/4/2019       Dear      Thank you for referring Kaye Modi to me for evaluation. Attached you will find relevant portions of my assessment and plan of care.    If you have questions, please do not hesitate to call me. I look forward to following Kaye Modi along with you.    Sincerely,    Giovanni William MD    Enclosure    If you would like to receive this communication electronically, please contact [email or web address] or [phone] to request I-Pulse Link access.    I-Pulse Link is a tool which provides read-only access to select patient information with whom you have a relationship. Its easy to use and provides real time access to review your patients record including encounter summaries, notes, results, and demographic information.    If you feel you have received this communication in error or would no longer like to receive these types of communications, please e-mail [email or web address]

## 2019-11-04 NOTE — PROGRESS NOTES
Subjective:       Patient ID: Kaye Modi is a 48 y.o. female.    Chief Complaint: No chief complaint on file.    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    Since then she has done very well and her only minor problem was the occurrence of an incisional hernia that was repaired in the early postoperative period.    She has never had any episodes of acute rejection although her enzymes have fluctuated mildly. She is currently on tacrolimus 3/2 mg twice a day and  mg twice a day.    Her glucose level is followed by her primary care doctor and she has controlled diabetes. She does have mild hypertension which is treated and followed by her primary care physician.  She remains well and her only problem is being overweight. She has recently managed to lose some weight.    LFTs- normal  Creatinine 0.78  Tac 5.3  There is no height or weight on file to calculate BMI.     She has been well over the last year and her BP is maintained around 130-140 mmHg systolic.  She has lost about 20 lb in weight.    Review of Systems   Constitutional: Negative for activity change, appetite change, chills, fatigue, fever and unexpected weight change.   HENT: Negative for ear pain, hearing loss, nosebleeds, sore throat and trouble swallowing.    Eyes: Negative for redness and visual disturbance.   Respiratory: Negative for cough, chest tightness, shortness of breath and wheezing.    Cardiovascular: Negative for chest pain and palpitations.   Gastrointestinal: Negative for abdominal distention, abdominal pain, blood in stool, constipation, diarrhea, nausea and vomiting.   Genitourinary: Negative for difficulty urinating, dysuria, frequency, hematuria and  urgency.   Musculoskeletal: Negative for arthralgias, back pain, gait problem, joint swelling and myalgias.   Skin: Negative for rash.   Neurological: Negative for tremors, seizures, speech difficulty, weakness and headaches.   Hematological: Negative for adenopathy.   Psychiatric/Behavioral: Negative for confusion, decreased concentration and sleep disturbance. The patient is not nervous/anxious.          Objective:    No examination done- this was a video visit.        Assessment:       1. Need for prophylactic immunotherapy    2. Liver replaced by transplant    3. Controlled type 2 diabetes mellitus without complication, without long-term current use of insulin    4. Autoimmune hepatitis        Plan:   - no change in immunosuppression  - encouraged to lose more weight  - discussed risks of NAFLD    Clinic in 1 year- video visit

## 2019-12-03 LAB
EXT ALBUMIN: 4.1
EXT ALKALINE PHOSPHATASE: 86
EXT ALT: 33
EXT AST: 28
EXT BASOPHIL%: 1
EXT BILIRUBIN TOTAL: 0.8
EXT BUN: 11
EXT CALCIUM: 8.4
EXT CHLORIDE: 101
EXT CO2: 25
EXT CREATININE: 0.74 MG/DL
EXT EOSINOPHIL%: 6
EXT GGT: 64
EXT GLUCOSE: 159
EXT HEMATOCRIT: 39.4
EXT HEMOGLOBIN: 12.5
EXT LYMPH%: 37
EXT MONOCYTES%: 8
EXT PLATELETS: 394
EXT POTASSIUM: 3.8
EXT PROTEIN TOTAL: 8.1
EXT SEGS%: 49
EXT SODIUM: 137 MMOL/L
EXT TACROLIMUS LVL: 6.6
EXT WBC: 5.5

## 2019-12-04 ENCOUNTER — TELEPHONE (OUTPATIENT)
Dept: TRANSPLANT | Facility: CLINIC | Age: 49
End: 2019-12-04

## 2019-12-04 NOTE — LETTER
December 4, 2019    Kaye Modi  [address]   [place] [address]          Dear Kaye Rian:  MRN: [id number]    This is a follow up to your recent labs, your lab results were stable.  There are no medicine changes.  Please have your labs drawn again on 3/2/20.      If you cannot have your labs drawn on the scheduled date, it is your responsibility to call the transplant department to reschedule.  To reschedule or make an appointment, please as to speak to or leave a message for my assistant, Sue Tovar or Ellie, at [phone].  When leaving a message for Sue Tovar Angela or myself, we ask that you leave a brief message regarding your request.    Sincerely,      Jeannie Bailey, RN, BSN, [facility]  Liver Transplant Coordinator  [facility]  [address] [place] [address]  [phone]

## 2019-12-04 NOTE — TELEPHONE ENCOUNTER
Letter sent, labs stable and no medication changes are needed. Repeat labs due 3/2/20 per protocol.

## 2020-03-10 LAB
EXT ALBUMIN: 3.3
EXT ALKALINE PHOSPHATASE: 81
EXT ALT: 40
EXT AST: 34
EXT BASOPHIL%: 1
EXT BILIRUBIN TOTAL: 0.3
EXT BUN: 11
EXT CALCIUM: 8.3
EXT CHLORIDE: 102
EXT CO2: 21
EXT CREATININE: 0.82 MG/DL
EXT EOSINOPHIL%: 9
EXT GGT: 56
EXT GLUCOSE: 262
EXT HEMATOCRIT: 37.8
EXT HEMOGLOBIN: 12.1
EXT LYMPH%: 38
EXT MONOCYTES%: 13
EXT PLATELETS: 322
EXT POTASSIUM: 4
EXT PROTEIN TOTAL: 7.8
EXT SEGS%: 39
EXT SODIUM: 134 MMOL/L
EXT TACROLIMUS LVL: 8.6
EXT WBC: 3.7

## 2020-03-12 ENCOUNTER — TELEPHONE (OUTPATIENT)
Dept: TRANSPLANT | Facility: CLINIC | Age: 50
End: 2020-03-12

## 2020-03-12 NOTE — LETTER
March 12, 2020    Kaye Modi  [address]  [place] [address]          Dear Kayejulienne Modi:  MRN: [id number]    This is a follow up to your recent labs, your lab results were stable.  There are no medicine changes.  Please have your labs drawn again on 6/8/20.      If you cannot have your labs drawn on the scheduled date, it is your responsibility to call the transplant department to reschedule.  To reschedule or make an appointment, please as to speak to or leave a message for my assistant, Sue Tovar or Ellie, at [phone].  When leaving a message for Sue Tovar Angela or myself, we ask that you leave a brief message regarding your request.    Sincerely,    Jeannie Bailey, RN, BSN, [facility]  Liver Transplant Coordinator  [facility]  [facility] [place] [address]  [phone]

## 2020-06-09 LAB
EXT ALBUMIN: 3.5
EXT ALKALINE PHOSPHATASE: 73
EXT ALT: 24
EXT AST: 17
EXT BASOPHIL%: 0
EXT BILIRUBIN TOTAL: 0.8
EXT BUN: 11
EXT CALCIUM: 8.5
EXT CHLORIDE: 99
EXT CO2: 21
EXT CREATININE: 0.96 MG/DL
EXT EOSINOPHIL%: 4
EXT GGT: 42
EXT GLUCOSE: 318
EXT HEMATOCRIT: 39.8
EXT HEMOGLOBIN: 12.4
EXT LYMPH%: 35
EXT MONOCYTES%: 7
EXT PLATELETS: 388
EXT POTASSIUM: 3.9
EXT PROTEIN TOTAL: 8.1
EXT SEGS%: 54
EXT SODIUM: 132 MMOL/L
EXT TACROLIMUS LVL: 8.8
EXT WBC: 5.1

## 2020-06-11 ENCOUNTER — TELEPHONE (OUTPATIENT)
Dept: TRANSPLANT | Facility: CLINIC | Age: 50
End: 2020-06-11

## 2020-06-11 NOTE — LETTER
June 11, 2020    Kaye Modi  [address]  [place] [address]          Dear Kaye Rian:  MRN: [id number]    This is a follow up to your recent labs, your lab results were stable.  There are no medicine changes.  Please have your labs drawn again on 9/7/20.      If you cannot have your labs drawn on the scheduled date, it is your responsibility to call the transplant department to reschedule.  Please call [phone] and ask to speak to [facility] -  for all scheduling requests.     Sincerely,    Jeannie Bailey, RN, BSN, [facility]  Liver Transplant Coordinator  [facility]  [address] [place] [address]  [phone]

## 2020-06-11 NOTE — TELEPHONE ENCOUNTER
Letter sent, labs stable and no medication changes are needed. Repeat labs due 9/7/20 per protocol.

## 2020-07-21 ENCOUNTER — TELEPHONE (OUTPATIENT)
Dept: TRANSPLANT | Facility: CLINIC | Age: 50
End: 2020-07-21

## 2020-07-21 NOTE — TELEPHONE ENCOUNTER
Spoke with pt. States she had a PCP apt and found to have elevated blood glucose. She wanted to know if she can take Januvia. Advised pt this is ok to take from a transplant perspective.

## 2020-09-11 ENCOUNTER — TELEPHONE (OUTPATIENT)
Dept: TRANSPLANT | Facility: CLINIC | Age: 50
End: 2020-09-11

## 2020-09-14 LAB
EXT ALBUMIN: 3.4
EXT ALKALINE PHOSPHATASE: 59
EXT ALT: 26
EXT AST: 17
EXT BASOPHIL%: 0
EXT BILIRUBIN TOTAL: 0.7
EXT BUN: 10
EXT CALCIUM: 8.8
EXT CHLORIDE: 102
EXT CO2: 25
EXT CREATININE: 0.89 MG/DL
EXT EOSINOPHIL%: 4
EXT GGT: 38
EXT GLUCOSE: 195
EXT HEMATOCRIT: 38.1
EXT HEMOGLOBIN: 12.1
EXT LYMPH%: 30
EXT MONOCYTES%: 10
EXT PLATELETS: 335
EXT POTASSIUM: 3.7
EXT PROTEIN TOTAL: 8.1
EXT SEGS%: 56
EXT SODIUM: 137 MMOL/L
EXT TACROLIMUS LVL: 7.8
EXT WBC: 6.7

## 2020-09-15 ENCOUNTER — TELEPHONE (OUTPATIENT)
Dept: TRANSPLANT | Facility: CLINIC | Age: 50
End: 2020-09-15

## 2020-09-15 NOTE — TELEPHONE ENCOUNTER
Letter sent, labs stable and no medication changes are needed. Repeat labs due 12/8/20 per protocol.    ----- Message from Giovanni William MD sent at 9/15/2020  1:55 PM CDT -----  Results reviewed

## 2020-09-15 NOTE — LETTER
September 15, 2020    Kaye Modi  [address]  [place] [address]          Dear Kaye Rian:  MRN: [id number]    This is a follow up to your recent labs, your lab results were stable.  There are no medicine changes.  Please have your labs drawn again on 12/8/20.      If you cannot have your labs drawn on the scheduled date, it is your responsibility to call the transplant department to reschedule.  Please call [phone] and ask to speak to [facility] -  for all scheduling requests.     Sincerely,    Jeannie Bailey, RN, BSN, CCTC  Sr Liver Transplant Coordinator  [facility]  [address] [place] [phone]

## 2020-12-10 LAB
EXT ALBUMIN: 3.3
EXT ALKALINE PHOSPHATASE: 46
EXT ALT: 38
EXT AST: 28
EXT BASOPHIL%: 0
EXT BILIRUBIN TOTAL: 0.7
EXT BUN: 13
EXT CALCIUM: 9
EXT CHLORIDE: 102
EXT CO2: 25
EXT CREATININE: 0.81 MG/DL
EXT EOSINOPHIL%: 4
EXT GGT: 27
EXT GLUCOSE: 167
EXT HEMATOCRIT: 38.1
EXT HEMOGLOBIN: 12.3
EXT LYMPH%: 34
EXT MONOCYTES%: 7
EXT PLATELETS: 348
EXT POTASSIUM: 3.8
EXT PROTEIN TOTAL: 7.1
EXT SEGS%: 54
EXT SODIUM: 139 MMOL/L
EXT TACROLIMUS LVL: 5.6
EXT WBC: 5.9

## 2020-12-15 ENCOUNTER — TELEPHONE (OUTPATIENT)
Dept: TRANSPLANT | Facility: CLINIC | Age: 50
End: 2020-12-15

## 2020-12-15 NOTE — LETTER
December 15, 2020    Kaye Modi  [address]  [place] [address]          Dear Kaye Rian:  MRN: [id number]    This is a follow up to your recent labs, your lab results were stable.  There are no medicine changes.  Please have your labs drawn again on 3/8/21.      If you cannot have your labs drawn on the scheduled date, it is your responsibility to call the transplant department to reschedule.  Please call [phone] and ask to speak to [facility] -  for all scheduling requests.     Sincerely,    Jeannie Bailey, RN, BSN, CCTC  Sr Liver Transplant Coordinator  [facility]  [address] [place] [phone]

## 2020-12-15 NOTE — TELEPHONE ENCOUNTER
Letter sent, labs stable and no medication changes are needed. Repeat labs due 3/8/21 per protocol.    ----- Message from Giovanni William MD sent at 12/15/2020  3:28 PM CST -----  Results reviewed

## 2021-01-08 ENCOUNTER — PATIENT MESSAGE (OUTPATIENT)
Dept: TRANSPLANT | Facility: CLINIC | Age: 51
End: 2021-01-08

## 2021-02-10 LAB
EXT ALBUMIN: 3.4
EXT ALKALINE PHOSPHATASE: 52
EXT ALT: 26
EXT AST: 20
EXT BASOPHIL%: 0
EXT BILIRUBIN TOTAL: 0.6
EXT BUN: 12
EXT CALCIUM: 8.4
EXT CHLORIDE: 102
EXT CO2: 24
EXT CREATININE: 0.85 MG/DL
EXT EOSINOPHIL%: 4
EXT GGT: 35
EXT GLUCOSE: 185
EXT HEMATOCRIT: 42.2
EXT HEMOGLOBIN: 13.4
EXT LYMPH%: 26
EXT MONOCYTES%: 8
EXT PLATELETS: 318
EXT POTASSIUM: 4
EXT PROTEIN TOTAL: 7.7
EXT SEGS%: 62
EXT SODIUM: 139 MMOL/L
EXT TACROLIMUS LVL: 7.3
EXT WBC: 6.9

## 2021-02-12 ENCOUNTER — TELEPHONE (OUTPATIENT)
Dept: TRANSPLANT | Facility: CLINIC | Age: 51
End: 2021-02-12

## 2021-05-18 ENCOUNTER — TELEPHONE (OUTPATIENT)
Dept: TRANSPLANT | Facility: CLINIC | Age: 51
End: 2021-05-18

## 2021-05-18 LAB
EXT ALBUMIN: 3.3
EXT ALKALINE PHOSPHATASE: 57
EXT ALT: 27
EXT AST: 16
EXT BASOPHIL%: 0
EXT BILIRUBIN TOTAL: 0.7
EXT BUN: 12
EXT CALCIUM: 9
EXT CHLORIDE: 100
EXT CO2: 23
EXT CREATININE: 0.77 MG/DL
EXT EOSINOPHIL%: 5
EXT GGT: 31
EXT GLUCOSE: 169
EXT HEMATOCRIT: 40.4
EXT HEMOGLOBIN: 12.5
EXT LYMPH%: 37
EXT MONOCYTES%: 6
EXT PLATELETS: 339
EXT POTASSIUM: 4.1
EXT PROTEIN TOTAL: 7.5
EXT SEGS%: 52
EXT SODIUM: 135 MMOL/L
EXT TACROLIMUS LVL: 6.5
EXT WBC: 6.4

## 2021-05-31 ENCOUNTER — PATIENT MESSAGE (OUTPATIENT)
Dept: TRANSPLANT | Facility: CLINIC | Age: 51
End: 2021-05-31

## 2021-05-31 ENCOUNTER — OFFICE VISIT (OUTPATIENT)
Dept: TRANSPLANT | Facility: CLINIC | Age: 51
End: 2021-05-31
Payer: COMMERCIAL

## 2021-05-31 DIAGNOSIS — K75.4 AUTOIMMUNE HEPATITIS: ICD-10-CM

## 2021-05-31 DIAGNOSIS — Z29.89 NEED FOR PROPHYLACTIC IMMUNOTHERAPY: ICD-10-CM

## 2021-05-31 DIAGNOSIS — Z94.4 LIVER REPLACED BY TRANSPLANT: Primary | ICD-10-CM

## 2021-05-31 PROCEDURE — 99213 PR OFFICE/OUTPT VISIT, EST, LEVL III, 20-29 MIN: ICD-10-PCS | Mod: 95,,, | Performed by: INTERNAL MEDICINE

## 2021-05-31 PROCEDURE — 99213 OFFICE O/P EST LOW 20 MIN: CPT | Mod: 95,,, | Performed by: INTERNAL MEDICINE

## 2021-08-03 ENCOUNTER — PATIENT MESSAGE (OUTPATIENT)
Dept: TRANSPLANT | Facility: CLINIC | Age: 51
End: 2021-08-03

## 2021-08-17 LAB
EXT ALBUMIN: 3.4
EXT ALKALINE PHOSPHATASE: 56
EXT ALT: 26
EXT AST: 15
EXT BASOPHIL%: 0
EXT BILIRUBIN TOTAL: 0.5
EXT BUN: 11
EXT CALCIUM: 7.9
EXT CHLORIDE: 104
EXT CO2: 25
EXT CREATININE: 0.87 MG/DL
EXT EOSINOPHIL%: 8
EXT GGT: 29
EXT GLUCOSE: 191
EXT HEMATOCRIT: 38.7
EXT HEMOGLOBIN: 12.9
EXT LYMPH%: 38
EXT MONOCYTES%: 5
EXT PLATELETS: 330
EXT POTASSIUM: 3.8
EXT PROTEIN TOTAL: 7.4
EXT SEGS%: 49
EXT SODIUM: 139 MMOL/L
EXT TACROLIMUS LVL: 6.7
EXT WBC: 5.5

## 2021-08-18 ENCOUNTER — TELEPHONE (OUTPATIENT)
Dept: TRANSPLANT | Facility: CLINIC | Age: 51
End: 2021-08-18

## 2022-02-09 LAB
EXT ALBUMIN: 3.6
EXT ALKALINE PHOSPHATASE: 62
EXT ALT: 21
EXT AST: 15
EXT BASOPHIL%: 0
EXT BILIRUBIN TOTAL: 0.7
EXT BUN: 10
EXT CALCIUM: 8.6
EXT CHLORIDE: 96
EXT CO2: 21
EXT CREATININE: 1.2 MG/DL
EXT EOSINOPHIL%: 3
EXT GGT: 34
EXT GLUCOSE: 468
EXT HEMATOCRIT: 42.6
EXT HEMOGLOBIN: 13.8
EXT LYMPH%: 22
EXT MONOCYTES%: 4
EXT PLATELETS: 308
EXT POTASSIUM: 4
EXT PROTEIN TOTAL: 8.1
EXT SEGS%: 72
EXT SODIUM: 132 MMOL/L
EXT TACROLIMUS LVL: 8
EXT WBC: 6.7

## 2022-02-10 ENCOUNTER — TELEPHONE (OUTPATIENT)
Dept: TRANSPLANT | Facility: CLINIC | Age: 52
End: 2022-02-10
Payer: COMMERCIAL

## 2022-02-10 NOTE — TELEPHONE ENCOUNTER
Letter sent, liver labs stable and no medication changes are needed. Repeat labs due 8/8/22 per protocol.    Advised pt of elevated blood sugar and need to follow-up closely with both PCP and endocrine.    ----- Message from Giovanni William MD sent at 2/10/2022 10:17 AM CST -----  Her sugar was very high- does she have endocrinology follow up?  Results reviewed

## 2022-02-10 NOTE — LETTER
February 10, 2022    Kaye Modi  [address]  [place] [address]          Dear Kaye Rian:  MRN: [id number]    This is a follow up to your recent labs, your liver lab results were stable.  There are no medicine changes.  Please have your labs drawn again on 8/8/22.      Your blood sugar was almost 500. Please follow-up with your PCP and endocrinologist asap.    If you cannot have your labs drawn on the scheduled date, it is your responsibility to call the transplant department to reschedule.  Please call [phone] and ask to speak to [facility] -  for all scheduling requests.     Sincerely,      Kadi [facility]  [facility] [place] [address]  [phone]

## 2022-05-11 ENCOUNTER — PATIENT MESSAGE (OUTPATIENT)
Dept: RESEARCH | Facility: CLINIC | Age: 52
End: 2022-05-11
Payer: COMMERCIAL

## 2022-06-08 ENCOUNTER — TELEPHONE (OUTPATIENT)
Dept: TRANSPLANT | Facility: CLINIC | Age: 52
End: 2022-06-08
Payer: COMMERCIAL

## 2022-06-08 ENCOUNTER — PATIENT MESSAGE (OUTPATIENT)
Dept: TRANSPLANT | Facility: CLINIC | Age: 52
End: 2022-06-08
Payer: COMMERCIAL

## 2022-06-09 ENCOUNTER — OFFICE VISIT (OUTPATIENT)
Dept: TRANSPLANT | Facility: CLINIC | Age: 52
End: 2022-06-09
Payer: COMMERCIAL

## 2022-06-09 DIAGNOSIS — Z94.4 LIVER REPLACED BY TRANSPLANT: ICD-10-CM

## 2022-06-09 DIAGNOSIS — K75.4 AUTOIMMUNE HEPATITIS: ICD-10-CM

## 2022-06-09 DIAGNOSIS — Z29.89 NEED FOR PROPHYLACTIC IMMUNOTHERAPY: Primary | ICD-10-CM

## 2022-06-09 PROCEDURE — 99212 OFFICE O/P EST SF 10 MIN: CPT | Mod: 95,,, | Performed by: INTERNAL MEDICINE

## 2022-06-09 PROCEDURE — 99212 PR OFFICE/OUTPT VISIT, EST, LEVL II, 10-19 MIN: ICD-10-PCS | Mod: 95,,, | Performed by: INTERNAL MEDICINE

## 2022-06-09 NOTE — PROGRESS NOTES
Subjective:       Patient ID: Kaye Modi is a 51 y.o. female.    Chief Complaint: No chief complaint on file.    VIDEO VISIT  The patient location is: [employer] [facility]  The chief complaint leading to consultation is: Liver transplant    Visit type: audiovisual    Face to Face time with patient: 10 minutes of total time spent on the encounter, which includes face to face time and non-face to face time preparing to see the patient (eg, review of tests), Obtaining and/or reviewing separately obtained history, Documenting clinical information in the electronic or other health record, Independently interpreting results (not separately reported) and communicating results to the patient/family/caregiver, or Care coordination (not separately reported).     Each patient to whom he or she provides medical services by telemedicine is:  (1) informed of the relationship between the physician and patient and the respective role of any other health care provider with respect to management of the patient; and (2) notified that he or she may decline to receive medical services by telemedicine and may withdraw from such care at any time.    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    Since then she has done very well and her only minor problem was the occurrence of an incisional hernia that was repaired in the early postoperative period.    She has never had any episodes of acute rejection although her enzymes have fluctuated mildly. She is currently on tacrolimus 3/2 mg twice a day and  mg twice a day.    Her glucose level is followed by her primary care doctor and she has controlled diabetes. She does have mild hypertension which is treated and followed by her primary care  physician.  She remains well and her only problem is being overweight. She has recently managed to lose some weight.    LFTs- normal  Creatinine 1.2  Tac 8    She has been well over the last year.    Review of Systems   Constitutional: Negative for activity change, appetite change, chills, fatigue, fever and unexpected weight change.   HENT: Negative for ear pain, hearing loss, nosebleeds, sore throat and trouble swallowing.    Eyes: Negative for redness and visual disturbance.   Respiratory: Negative for cough, chest tightness, shortness of breath and wheezing.    Cardiovascular: Negative for chest pain and palpitations.   Gastrointestinal: Negative for abdominal distention, abdominal pain, blood in stool, constipation, diarrhea, nausea and vomiting.   Genitourinary: Negative for difficulty urinating, dysuria, frequency, hematuria and urgency.   Musculoskeletal: Negative for arthralgias, back pain, gait problem, joint swelling and myalgias.   Skin: Negative for rash.   Neurological: Negative for tremors, seizures, speech difficulty, weakness and headaches.   Hematological: Negative for adenopathy.   Psychiatric/Behavioral: Negative for confusion, decreased concentration and sleep disturbance. The patient is not nervous/anxious.          Objective:    No examination done- this was a video visit.        Assessment:       1. Need for prophylactic immunotherapy    2. Liver replaced by transplant    3. Autoimmune hepatitis        Plan:   - no change in immunosuppression  - encouraged to lose more weight  - discussed risks of NAFLD    Clinic in 1 year- video visit

## 2022-06-09 NOTE — Clinical Note
June 9, 2022                     [facility] 1st [facility]  [address]  [facility] [address]  Phone: [phone]   Patient: Kaye Modi   MR Number: [id number]   YOB: 1970   Date of Visit: 6/9/2022       Dear      Thank you for referring Kaye Modi to me for evaluation. Attached you will find relevant portions of my assessment and plan of care.    If you have questions, please do not hesitate to call me. I look forward to following Kaye Modi along with you.    Sincerely,    Giovanni William MD    Enclosure    If you would like to receive this communication electronically, please contact [email or web address] or [phone] to request Pikum Link access.    Pikum Link is a tool which provides read-only access to select patient information with whom you have a relationship. Its easy to use and provides real time access to review your patients record including encounter summaries, notes, results, and demographic information.    If you feel you have received this communication in error or would no longer like to receive these types of communications, please e-mail [email or web address]

## 2022-08-19 LAB
EXT ALBUMIN: 3.6
EXT ALKALINE PHOSPHATASE: 51
EXT ALT: 31
EXT AST: 19
EXT BASOPHIL%: 0
EXT BILIRUBIN TOTAL: 0.6
EXT BUN: 23
EXT CALCIUM: 9.2
EXT CHLORIDE: 96
EXT CO2: 27
EXT CREATININE: 1.07 MG/DL
EXT EOSINOPHIL%: 4
EXT GLUCOSE: 346
EXT HEMATOCRIT: 42
EXT HEMOGLOBIN: 13.6
EXT LYMPH%: 24
EXT MONOCYTES%: 4
EXT PLATELETS: 296
EXT POTASSIUM: 4.2
EXT PROTEIN TOTAL: 8
EXT SEGS%: 68
EXT SODIUM: 133 MMOL/L
EXT TACROLIMUS LVL: 6.4
EXT WBC: 7.2

## 2022-08-22 ENCOUNTER — TELEPHONE (OUTPATIENT)
Dept: TRANSPLANT | Facility: CLINIC | Age: 52
End: 2022-08-22
Payer: COMMERCIAL

## 2022-08-22 NOTE — TELEPHONE ENCOUNTER
Letter sent, liver labs stable and no medication changes are needed. Blood sugars are high - advised pt to follow closely with PCP and endocrinologist for better control. Repeat labs due 2/6/23 per protocol.  ----- Message from Giovanni William MD sent at 8/22/2022  9:55 AM CDT -----  Results reviewed

## 2022-08-22 NOTE — LETTER
August 22, 2022    Kaye Modi  [address]  [place] [address]          Dear Kaye Rian:  MRN: [id number]    This is a follow up to your recent labs, your liver lab results were stable.  Your blood sugar was very high - make sure you are following closely with your PCP and endocrinologist to get this under better control. There are no transplant medicine changes.  Please have your labs drawn again on 2/6/23.      If you cannot have your labs drawn on the scheduled date, it is your responsibility to call the transplant department to reschedule.  Please call [phone] and ask to speak to [facility] Assistant for all scheduling requests.     Sincerely,      Kadi [facility]  [address] [place] [address]  [phone]

## 2023-02-09 LAB
EXT ALBUMIN: 3.7
EXT ALKALINE PHOSPHATASE: 63
EXT ALT: 39
EXT AST: 23
EXT BILIRUBIN TOTAL: 0.5
EXT BUN: 12
EXT CALCIUM: 8.6
EXT CHLORIDE: 99
EXT CO2: 25
EXT CREATININE: 0.8 MG/DL
EXT GFR MDRD AF AMER: 132
EXT GGT: 79
EXT GLUCOSE: 240
EXT POTASSIUM: 4.2
EXT PROTEIN TOTAL: 8.6
EXT SODIUM: 133 MMOL/L
EXT TACROLIMUS LVL: 7.4

## 2023-02-10 ENCOUNTER — TELEPHONE (OUTPATIENT)
Dept: TRANSPLANT | Facility: CLINIC | Age: 53
End: 2023-02-10
Payer: COMMERCIAL

## 2023-02-10 NOTE — LETTER
February 10, 2023    Kaye Modi  [address]  [place] [address]          Dear Kaye Modi:  MRN: [id number]    This is a follow up to your recent labs, your lab results were stable.  There are no medicine changes.  Please have your labs drawn again on 8/6/23.      If you cannot have your labs drawn on the scheduled date, it is your responsibility to call the transplant department to reschedule.  Please call [phone] and ask to speak to [facility]  for all scheduling requests.     Sincerely,      Kadi [facility]  [facility] [place] [address]  [phone]

## 2023-02-10 NOTE — TELEPHONE ENCOUNTER
Letter sent, labs stable and no medication changes are needed. Repeat labs due 8/6/23 per protocol.  ----- Message from Giovanni William MD sent at 2/10/2023  8:12 AM CST -----  Results reviewed

## 2023-08-09 ENCOUNTER — PATIENT MESSAGE (OUTPATIENT)
Dept: TRANSPLANT | Facility: CLINIC | Age: 53
End: 2023-08-09
Payer: COMMERCIAL

## 2023-08-09 ENCOUNTER — OFFICE VISIT (OUTPATIENT)
Dept: TRANSPLANT | Facility: CLINIC | Age: 53
End: 2023-08-09
Payer: COMMERCIAL

## 2023-08-09 DIAGNOSIS — Z29.89 NEED FOR PROPHYLACTIC IMMUNOTHERAPY: ICD-10-CM

## 2023-08-09 DIAGNOSIS — K75.4 AUTOIMMUNE HEPATITIS: Primary | ICD-10-CM

## 2023-08-09 DIAGNOSIS — Z94.4 LIVER REPLACED BY TRANSPLANT: ICD-10-CM

## 2023-08-09 LAB
EXT ALBUMIN: 3.6
EXT ALKALINE PHOSPHATASE: 76
EXT ALT: 25
EXT AST: 19
EXT BASOPHIL%: 1
EXT BILIRUBIN TOTAL: 0.6
EXT BUN: 18
EXT CALCIUM: 9.1
EXT CHLORIDE: 99
EXT CO2: 25
EXT CREATININE: 1.1 MG/DL
EXT EOSINOPHIL%: 6
EXT GFR MDRD AF AMER: 91
EXT GGT: 60
EXT GLUCOSE: 294
EXT HEMATOCRIT: 44.6
EXT HEMOGLOBIN: 14.4
EXT LYMPH%: 36
EXT MONOCYTES%: 10
EXT PLATELETS: 336
EXT POTASSIUM: 4
EXT PROTEIN TOTAL: 8.4
EXT SEGS%: 49
EXT SODIUM: 135 MMOL/L
EXT TACROLIMUS LVL: 3.9
EXT WBC: 5.5

## 2023-08-09 PROCEDURE — 99214 OFFICE O/P EST MOD 30 MIN: CPT | Mod: 95,,, | Performed by: INTERNAL MEDICINE

## 2023-08-09 PROCEDURE — 1160F RVW MEDS BY RX/DR IN RCRD: CPT | Mod: 95,CPTII,, | Performed by: INTERNAL MEDICINE

## 2023-08-09 PROCEDURE — 1160F PR REVIEW ALL MEDS BY PRESCRIBER/CLIN PHARMACIST DOCUMENTED: ICD-10-PCS | Mod: 95,CPTII,, | Performed by: INTERNAL MEDICINE

## 2023-08-09 PROCEDURE — 99214 PR OFFICE/OUTPT VISIT, EST, LEVL IV, 30-39 MIN: ICD-10-PCS | Mod: 95,,, | Performed by: INTERNAL MEDICINE

## 2023-08-09 PROCEDURE — 1159F PR MEDICATION LIST DOCUMENTED IN MEDICAL RECORD: ICD-10-PCS | Mod: 95,CPTII,, | Performed by: INTERNAL MEDICINE

## 2023-08-09 PROCEDURE — 1159F MED LIST DOCD IN RCRD: CPT | Mod: 95,CPTII,, | Performed by: INTERNAL MEDICINE

## 2023-08-09 NOTE — Clinical Note
August 9, 2023                     [facility] 1st [facility]  [address]  [facility] [address]  Phone: [phone]   Patient: Kaye Modi   MR Number: [id number]   YOB: 1970   Date of Visit: 8/9/2023       Dear      Thank you for referring Kaye Modi to me for evaluation. Attached you will find relevant portions of my assessment and plan of care.    If you have questions, please do not hesitate to call me. I look forward to following Kaye Modi along with you.    Sincerely,    Giovanni William MD    Enclosure    If you would like to receive this communication electronically, please contact [email or web address] or [phone] to request [employer] Link access.    [employer] Link is a tool which provides read-only access to select patient information with whom you have a relationship. Its easy to use and provides real time access to review your patients record including encounter summaries, notes, results, and demographic information.    If you feel you have received this communication in error or would no longer like to receive these types of communications, please e-mail [email or web address]

## 2023-08-09 NOTE — PROGRESS NOTES
Subjective:       Patient ID: Kaye Modi is a 52 y.o. female.    Chief Complaint: No chief complaint on file.    VIDEO VISIT  The patient location is: [employer] shop  The chief complaint leading to consultation is: Liver transplant    Visit type: audiovisual    Face to Face time with patient: 10 minutes of total time spent on the encounter, which includes face to face time and non-face to face time preparing to see the patient (eg, review of tests), Obtaining and/or reviewing separately obtained history, Documenting clinical information in the electronic or other health record, Independently interpreting results (not separately reported) and communicating results to the patient/family/caregiver, or Care coordination (not separately reported).     Each patient to whom he or she provides medical services by telemedicine is:  (1) informed of the relationship between the physician and patient and the respective role of any other health care provider with respect to management of the patient; and (2) notified that he or she may decline to receive medical services by telemedicine and may withdraw from such care at any time.    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    Since then she has done very well and her only minor problem was the occurrence of an incisional hernia that was repaired in the early postoperative period.    She has never had any episodes of acute rejection although her enzymes have fluctuated mildly. She is currently on tacrolimus 3/2 mg twice a day and  mg twice a day.    Her glucose level is followed by her primary care doctor and she has controlled diabetes. She does have mild hypertension which is treated and followed by her primary care  physician.  She remains well and her only problem is being overweight. She has recently managed to lose some weight.    LFTs- normal  Creatinine 0.8  Tac 7.4    She has been well over the last year.    Review of Systems   Constitutional:  Negative for activity change, appetite change, chills, fatigue, fever and unexpected weight change.   HENT:  Negative for ear pain, hearing loss, nosebleeds, sore throat and trouble swallowing.    Eyes:  Negative for redness and visual disturbance.   Respiratory:  Negative for cough, chest tightness, shortness of breath and wheezing.    Cardiovascular:  Negative for chest pain and palpitations.   Gastrointestinal:  Negative for abdominal distention, abdominal pain, blood in stool, constipation, diarrhea, nausea and vomiting.   Genitourinary:  Negative for difficulty urinating, dysuria, frequency, hematuria and urgency.   Musculoskeletal:  Negative for arthralgias, back pain, gait problem, joint swelling and myalgias.   Skin:  Negative for rash.   Neurological:  Negative for tremors, seizures, speech difficulty, weakness and headaches.   Hematological:  Negative for adenopathy.   Psychiatric/Behavioral:  Negative for confusion, decreased concentration and sleep disturbance. The patient is not nervous/anxious.          Objective:    No examination done- this was a video visit.        Assessment:       1. Autoimmune hepatitis    2. Need for prophylactic immunotherapy    3. Liver replaced by transplant          Plan:   - no change in immunosuppression- Tac 3/2 mg daily  - encouraged to lose more weight  - discussed risks of NAFLD    Clinic in 1 year- video visit

## 2023-08-17 ENCOUNTER — TELEPHONE (OUTPATIENT)
Dept: TRANSPLANT | Facility: CLINIC | Age: 53
End: 2023-08-17
Payer: COMMERCIAL

## 2023-08-17 NOTE — LETTER
August 17, 2023    Kaye Modi  [address]  [place] [address]          Dear Kaye Modi:  MRN: [id number]    This is a follow up to your recent labs, your lab results were stable.  There are no medicine changes.  Please have your labs drawn again the week of 2/5/24..      If you cannot have your labs drawn on the scheduled date, it is your responsibility to call the transplant department to reschedule.  Please call [phone] and ask to speak to [facility]  for all scheduling requests.     Sincerely,      Jeannie  Your Liver Transplant Coordinator    [facility]  [facility] [place] [address]  [phone]

## 2023-08-17 NOTE — TELEPHONE ENCOUNTER
Letter sent, labs stable and no medication changes are needed. Repeat labs due 2/5/2024 per protocol.  ----- Message from Giovanni William MD sent at 8/15/2023  2:35 PM CDT -----  Results reviewed

## 2023-10-20 DIAGNOSIS — Z94.4 LIVER REPLACED BY TRANSPLANT: Primary | ICD-10-CM

## 2023-10-20 RX ORDER — MYCOPHENOLATE MOFETIL 250 MG/1
500 CAPSULE ORAL 2 TIMES DAILY
Qty: 360 CAPSULE | Refills: 3 | Status: SHIPPED | OUTPATIENT
Start: 2023-10-20 | End: 2024-10-19

## 2023-10-20 RX ORDER — TACROLIMUS 1 MG/1
CAPSULE ORAL
Qty: 450 CAPSULE | Refills: 3 | Status: SHIPPED | OUTPATIENT
Start: 2023-10-20 | End: 2024-10-19

## 2024-02-08 LAB
EXT ALBUMIN: 3.3
EXT ALKALINE PHOSPHATASE: 55
EXT ALT: 17
EXT AST: 12
EXT BASOPHIL%: 1
EXT BILIRUBIN TOTAL: 0.7
EXT BUN: 16
EXT CALCIUM: 8.9
EXT CHLORIDE: 96
EXT CO2: 28
EXT CREATININE: 1 MG/DL
EXT EOSINOPHIL%: 5
EXT GFR MDRD AF AMER: 101
EXT GGT: 31
EXT GLUCOSE: 230
EXT HEMATOCRIT: 42.4
EXT HEMOGLOBIN: 13.6
EXT LYMPH%: 35
EXT MONOCYTES%: 8
EXT PLATELETS: 280
EXT POTASSIUM: 3.2
EXT PROTEIN TOTAL: 7.9
EXT SEGS%: 52
EXT SODIUM: 134 MMOL/L
EXT TACROLIMUS LVL: 8.3
EXT WBC: 6.1

## 2024-02-09 ENCOUNTER — TELEPHONE (OUTPATIENT)
Dept: TRANSPLANT | Facility: CLINIC | Age: 54
End: 2024-02-09
Payer: COMMERCIAL

## 2024-02-09 NOTE — TELEPHONE ENCOUNTER
Letter sent, liver labs stable and no medication changes are needed. Advised pt that blood glucose was very elevated and recommend she continue to follow closely with her PCP for better management. Repeat labs due 8/5/24 per protocol.  ----- Message from Giovanni William MD sent at 2/9/2024  8:48 AM CST -----  Results reviewed

## 2024-02-09 NOTE — LETTER
February 9, 2024    Kaye Modi  [address]  [place] [address]          Dear Kaye Rian:  MRN: [id number]    This is a follow up to your recent labs, your liver lab results were stable.  There are no medicine changes.  Please have your labs drawn again on 8/5/24.      Your blood sugar was over 200. Please make sure you are following closely with your PCP for better management to prevent long term health complications.    If you cannot have your labs drawn on the scheduled date, it is your responsibility to call the transplant department to reschedule.  Please call [phone] and ask to speak to [facility] for all scheduling requests.     Sincerely,      Jeannie  Your Liver Transplant Coordinator    [facility]  [facility] [place] [address]  [phone]

## 2024-08-06 LAB
EXT ALBUMIN: 3.5
EXT ALKALINE PHOSPHATASE: 51
EXT ALT: 27
EXT AST: 22
EXT BASOPHIL%: 0
EXT BILIRUBIN TOTAL: 0.7
EXT BUN: 20
EXT CALCIUM: 9
EXT CHLORIDE: 102
EXT CO2: 23
EXT CREATININE: 1 MG/DL
EXT EOSINOPHIL%: 8
EXT GFR MDRD AF AMER: 101
EXT GGT: 39
EXT GLUCOSE: 239
EXT HEMATOCRIT: 45.4
EXT HEMOGLOBIN: 14.6
EXT LYMPH%: 45
EXT MONOCYTES%: 7
EXT PLATELETS: 295
EXT POTASSIUM: 3.7
EXT PROTEIN TOTAL: 7.8
EXT SEGS%: 40
EXT SODIUM: 137 MMOL/L
EXT TACROLIMUS LVL: 4
EXT WBC: 4.4

## 2024-08-12 ENCOUNTER — TELEPHONE (OUTPATIENT)
Dept: TRANSPLANT | Facility: CLINIC | Age: 54
End: 2024-08-12
Payer: COMMERCIAL

## 2024-08-12 NOTE — LETTER
August 12, 2024    Kaye Modi  [address]  [place] [address]          Dear Kaye Modi:  MRN: [id number]    This is a follow up to your recent labs, your lab results were stable.  There are no medicine changes.  Please have your labs drawn again on 2/03/25.      If you cannot have your labs drawn on the scheduled date, it is your responsibility to call the transplant department to reschedule.  Please call [phone] and ask to speak to [facility]  for all scheduling requests.     Sincerely,        Your Liver Transplant Coordinator    [facility]  [address] [place] [address]  [phone]

## 2024-08-12 NOTE — TELEPHONE ENCOUNTER
Continue routine labs no changes needed.  Letter sent for next lab appointment 2/03/25, per protocol      ----- Message from Giovanni William MD sent at 8/12/2024  9:18 AM CDT -----  Results reviewed

## 2024-09-27 DIAGNOSIS — Z94.4 LIVER REPLACED BY TRANSPLANT: ICD-10-CM

## 2024-09-30 RX ORDER — TACROLIMUS 1 MG/1
CAPSULE ORAL
Qty: 450 CAPSULE | Refills: 11 | Status: SHIPPED | OUTPATIENT
Start: 2024-09-30

## 2024-09-30 RX ORDER — MYCOPHENOLATE MOFETIL 250 MG/1
500 CAPSULE ORAL 2 TIMES DAILY
Qty: 360 CAPSULE | Refills: 11 | Status: SHIPPED | OUTPATIENT
Start: 2024-09-30

## 2025-01-10 ENCOUNTER — PATIENT MESSAGE (OUTPATIENT)
Dept: TRANSPLANT | Facility: CLINIC | Age: 55
End: 2025-01-10

## 2025-01-10 ENCOUNTER — OFFICE VISIT (OUTPATIENT)
Dept: TRANSPLANT | Facility: CLINIC | Age: 55
End: 2025-01-10
Payer: COMMERCIAL

## 2025-01-10 DIAGNOSIS — Z29.89 NEED FOR PROPHYLACTIC IMMUNOTHERAPY: Primary | ICD-10-CM

## 2025-01-10 DIAGNOSIS — E11.9 CONTROLLED TYPE 2 DIABETES MELLITUS WITHOUT COMPLICATION, WITHOUT LONG-TERM CURRENT USE OF INSULIN: ICD-10-CM

## 2025-01-10 DIAGNOSIS — K75.4 AUTOIMMUNE HEPATITIS: ICD-10-CM

## 2025-01-10 DIAGNOSIS — I10 PRIMARY HYPERTENSION: ICD-10-CM

## 2025-01-10 NOTE — LETTER
January 10, 2025                      [facility]  [address]  [facility] [address]  Phone: [phone]   Patient: Kaye Modi   MR Number: [id number]   YOB: 1970   Date of Visit: 1/10/2025       Dear       Thank you for referring Kaye Modi to me for evaluation. Attached you will find relevant portions of my assessment and plan of care.    If you have questions, please do not hesitate to call me. I look forward to following Kaye Modi along with you.    Sincerely,    Giovanni William MD    Enclosure    If you would like to receive this communication electronically, please contact [email or web address] or [phone] to request [employer] access.    [employer] is a tool which provides read-only access to select patient information with whom you have a relationship. Its easy to use and provides real time access to review your patients record including encounter summaries, notes, results, and demographic information.    If you feel you have received this communication in error or would no longer like to receive these types of communications, please e-mail [email or web address]

## 2025-01-10 NOTE — PROGRESS NOTES
Subjective:       Patient ID: Kaye Modi is a 54 y.o. female.    Chief Complaint: Liver Transplant Follow-up    VIDEO VISIT  The patient location is: Car  The chief complaint leading to consultation is: Liver transplant    Visit type: audiovisual    Face to Face time with patient: 10 minutes of total time spent on the encounter, which includes face to face time and non-face to face time preparing to see the patient (eg, review of tests), Obtaining and/or reviewing separately obtained history, Documenting clinical information in the electronic or other health record, Independently interpreting results (not separately reported) and communicating results to the patient/family/caregiver, or Care coordination (not separately reported).     Each patient to whom he or she provides medical services by telemedicine is:  (1) informed of the relationship between the physician and patient and the respective role of any other health care provider with respect to management of the patient; and (2) notified that he or she may decline to receive medical services by telemedicine and may withdraw from such care at any time.    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    Since then she has done very well and her only minor problem was the occurrence of an incisional hernia that was repaired in the early postoperative period.    She has never had any episodes of acute rejection although her enzymes have fluctuated mildly. She is currently on tacrolimus 3/2 mg twice a day and  mg twice a day.    Her glucose level is followed by her primary care doctor and she has controlled diabetes. She does have mild hypertension which is treated and followed by her primary care physician.  She remains  well and her only problem is being overweight. She has recently managed to lose some weight.    LFTs- normal  Creatinine 0.8  Tac 7.4    She has been well over the last year.    Review of Systems   Constitutional:  Negative for activity change, appetite change, chills, fatigue, fever and unexpected weight change.   HENT:  Negative for ear pain, hearing loss, nosebleeds, sore throat and trouble swallowing.    Eyes:  Negative for redness and visual disturbance.   Respiratory:  Negative for cough, chest tightness, shortness of breath and wheezing.    Cardiovascular:  Negative for chest pain and palpitations.   Gastrointestinal:  Negative for abdominal distention, abdominal pain, blood in stool, constipation, diarrhea, nausea and vomiting.   Genitourinary:  Negative for difficulty urinating, dysuria, frequency, hematuria and urgency.   Musculoskeletal:  Negative for arthralgias, back pain, gait problem, joint swelling and myalgias.   Skin:  Negative for rash.   Neurological:  Negative for tremors, seizures, speech difficulty, weakness and headaches.   Hematological:  Negative for adenopathy.   Psychiatric/Behavioral:  Negative for confusion, decreased concentration and sleep disturbance. The patient is not nervous/anxious.          Objective:    No examination done- this was a video visit.        Assessment:       1. Need for prophylactic immunotherapy    2. Primary hypertension    3. Controlled type 2 diabetes mellitus without complication, without long-term current use of insulin    4. Autoimmune hepatitis            Plan:   - no change in immunosuppression- Tac 3/2 mg +  mg BID.  - encouraged to lose more weight  - discussed risks of MASLD    Clinic in 1 year- video visit

## 2025-02-07 LAB
EXT ALBUMIN: 3.7
EXT ALKALINE PHOSPHATASE: 64
EXT ALT: 29
EXT AST: 24
EXT BASOPHIL%: 1
EXT BILIRUBIN TOTAL: 0.7
EXT BUN: 20
EXT CALCIUM: 9.4
EXT CHLORIDE: 99
EXT CO2: 28
EXT CREATININE: 1 MG/DL
EXT EOSINOPHIL%: 6
EXT GFR MDRD NON AF AMER: 101
EXT GGT: 42
EXT GLUCOSE: 274
EXT HEMATOCRIT: 45.5
EXT HEMOGLOBIN: 14.4
EXT LYMPH%: 40
EXT MONOCYTES%: 14
EXT PLATELETS: 308
EXT POTASSIUM: 3.7
EXT PROTEIN TOTAL: 8.4
EXT SEGS%: 39
EXT SODIUM: 139 MMOL/L
EXT TACROLIMUS LVL: 4.8
EXT WBC: 4.4

## 2025-02-12 ENCOUNTER — TELEPHONE (OUTPATIENT)
Dept: TRANSPLANT | Facility: CLINIC | Age: 55
End: 2025-02-12
Payer: COMMERCIAL

## 2025-02-12 NOTE — LETTER
February 12, 2025    Kaye Modi  [address]  [place] [address]          Dear Kaye Modi:  MRN: [id number]    This is a follow up to your recent labs, your lab results were stable.  There are no medicine changes.  Please have your labs drawn again on 8/4/25.      If you cannot have your labs drawn on the scheduled date, it is your responsibility to call the transplant department to reschedule.  Please call [phone] and ask to speak to [facility]  for all scheduling requests.     Sincerely,    Jeannie  Your Liver Transplant Coordinator    [facility]  [address] [place] [id number]  [phone]

## 2025-02-12 NOTE — TELEPHONE ENCOUNTER
Letter sent, liver labs stable and no medication changes are needed. Repeat labs due 8/4/25 per protocol.  ----- Message from Giovanni William MD sent at 2/11/2025  4:04 PM CST -----  Results reviewed

## 2025-08-12 ENCOUNTER — RESULTS FOLLOW-UP (OUTPATIENT)
Dept: TRANSPLANT | Facility: CLINIC | Age: 55
End: 2025-08-12
Payer: COMMERCIAL

## 2025-08-12 LAB
EXT ALBUMIN: 3.5
EXT ALKALINE PHOSPHATASE: 71
EXT ALT: 31
EXT AST: 20
EXT BASOPHIL%: 0
EXT BILIRUBIN TOTAL: 0.9
EXT BUN: 9
EXT CALCIUM: 9
EXT CHLORIDE: 99
EXT CO2: 27
EXT CREATININE: 0.7 MG/DL
EXT EOSINOPHIL%: 6
EXT GFR MDRD AF AMER: 152
EXT GGT: 52
EXT GLUCOSE: 344
EXT HEMATOCRIT: 42.2
EXT HEMOGLOBIN: 13.6
EXT LYMPH%: 37
EXT MONOCYTES%: 16
EXT PLATELETS: 318
EXT POTASSIUM: 3.9
EXT PROTEIN TOTAL: 7.8
EXT SEGS%: 41
EXT SODIUM: 135 MMOL/L
EXT TACROLIMUS LVL: 5.6
EXT WBC: 4.3